# Patient Record
Sex: FEMALE | Race: WHITE | NOT HISPANIC OR LATINO
[De-identification: names, ages, dates, MRNs, and addresses within clinical notes are randomized per-mention and may not be internally consistent; named-entity substitution may affect disease eponyms.]

---

## 2023-04-25 PROBLEM — Z00.00 ENCOUNTER FOR PREVENTIVE HEALTH EXAMINATION: Status: ACTIVE | Noted: 2023-04-25

## 2023-04-27 ENCOUNTER — APPOINTMENT (OUTPATIENT)
Dept: ORTHOPEDIC SURGERY | Facility: CLINIC | Age: 68
End: 2023-04-27
Payer: COMMERCIAL

## 2023-04-27 VITALS — BODY MASS INDEX: 29.45 KG/M2 | HEIGHT: 61.5 IN | WEIGHT: 158 LBS

## 2023-04-27 DIAGNOSIS — I10 ESSENTIAL (PRIMARY) HYPERTENSION: ICD-10-CM

## 2023-04-27 DIAGNOSIS — Z78.9 OTHER SPECIFIED HEALTH STATUS: ICD-10-CM

## 2023-04-27 PROCEDURE — 99204 OFFICE O/P NEW MOD 45 MIN: CPT

## 2023-04-27 RX ORDER — PANTOPRAZOLE 20 MG/1
20 TABLET, DELAYED RELEASE ORAL
Refills: 0 | Status: ACTIVE | COMMUNITY

## 2023-04-27 RX ORDER — CYCLOBENZAPRINE HYDROCHLORIDE 5 MG/1
5 TABLET, FILM COATED ORAL
Qty: 30 | Refills: 1 | Status: COMPLETED | COMMUNITY
Start: 2023-04-27 | End: 2023-06-26

## 2023-04-27 RX ORDER — MELOXICAM 15 MG/1
15 TABLET ORAL DAILY
Qty: 30 | Refills: 1 | Status: ACTIVE | COMMUNITY
Start: 2023-04-27 | End: 1900-01-01

## 2023-04-27 NOTE — IMAGING
[de-identified] : Right shoulder No swelling, no ecchymosis, no rayna deformity\par Tenderness to palpation over greater tuberosity\par No instability or tenderness over AC joint\par Full range of motion with pain\par 5/5 supraspinatus, infraspinatus and subscapularis; there is pain with strength testing\par Positive Shah test, positive impingement sign\par Speed’s and yergason negative\par Motor and sensory intact distally\par

## 2023-04-27 NOTE — HISTORY OF PRESENT ILLNESS
[0] : 0 [8] : 8 [Dull/Aching] : dull/aching [Sharp] : sharp [Tingling] : tingling [Occasional] : occasional [Sleep] : sleep [Meds] : meds [Lying in bed] : lying in bed [Full time] : Work status: full time [] : Post Surgical Visit: no [FreeTextEntry1] : RT shoulder [FreeTextEntry5] : 68 yo RHD F here for right shoulder eval. Pt fell onto right arm in February 2023 and has had right upper arm pain since.  Has some tingling in right hand at times. Was seen by Elier Caballero MD of the [facility], had XR/MRI done, Tx with CSI on 2/20/23. Pt was told she needs Sx for right rotator cuff tear and is here for second opinion.  [FreeTextEntry9] : Aleve, Advil, Naproxen [de-identified] : Feb 2023 [de-identified] : Dr. Elier Caballero [de-identified] : XR/MRI [de-identified] : n/a [de-identified] : [employer] in [employer]

## 2023-04-27 NOTE — ASSESSMENT
[FreeTextEntry1] : I personally reviewed and interpreted the MRI images from [facility]..  GRound fall in February with ensuing right shoulder pain.  Seen at the time and given a corticosteroid injection which helped.  Subsequent MRI was obtained and this shows subcentimeter focal anterior supra spinatus tear.  Cannot quite tell if this is a chronic issue or may be an acute on chronic tear on MRI.  Explained high incidence of these tears in her age group.  Either way still does not meet criteria for surgical repair.  Course of physical therapy is advised.  Consider repeat MRI in 3 to 6 months to ensure no progression.  Explained if night pain persists and if tear progresses may eventually need to have this repaired.

## 2023-06-08 ENCOUNTER — APPOINTMENT (OUTPATIENT)
Dept: ORTHOPEDIC SURGERY | Facility: CLINIC | Age: 68
End: 2023-06-08
Payer: COMMERCIAL

## 2023-06-08 VITALS — BODY MASS INDEX: 29.45 KG/M2 | HEIGHT: 61.5 IN | WEIGHT: 158 LBS

## 2023-06-08 DIAGNOSIS — M75.121 COMPLETE ROTATOR CUFF TEAR OR RUPTURE OF RIGHT SHOULDER, NOT SPECIFIED AS TRAUMATIC: ICD-10-CM

## 2023-06-08 PROCEDURE — 99213 OFFICE O/P EST LOW 20 MIN: CPT

## 2023-06-08 NOTE — IMAGING
[de-identified] : No swelling, no ecchymosis, no rayna deformity\par Tenderness to palpation over greater tuberosity\par No instability or tenderness over AC joint\par Full range of motion with minimal pain\par 5/5 supraspinatus, infraspinatus and subscapularis; there is pain with strength testing\par Positive Shah test, positive impingement sign\par Speed’s and yergason negative\par Motor and sensory intact distally\par

## 2023-06-08 NOTE — HISTORY OF PRESENT ILLNESS
[1] : 2 [0] : 0 [FreeTextEntry5] : Pt is here for right shoulder follow up. Pt states pain has improved since the last visit. Pt is going to PT 2x weekly with good relief.  [de-identified] : cyclobenzaprine, PT